# Patient Record
(demographics unavailable — no encounter records)

---

## 2017-05-25 NOTE — RADRPT
PROCEDURE:   XR right wrist. 

 

CLINICAL INDICATION:   Wrist pain 

 

TECHNIQUE:   4 views are available for review. 

 

COMPARISON:   No prior studies are available for comparison. 

 

FINDINGS:

 

The osseous structures are normal in mineralization, architecture and alignment.  No fracture or oss
eous lesion is identified.  The joints are unremarkable. No erosions are identified.The soft tissues
 are unremarkable.

 

 

IMPRESSION:

 

 

Unremarkable examination.    

 

 

RPTAT: HGDB

_____________________________________________ 

.Donnie Matson MD, MD           Date    Time 

Electronically viewed and signed by .Donnie Matson MD, MD on 05/25/2017 15:09 

 

D:  05/25/2017 15:09  T:  05/25/2017 15:09

.B/

## 2017-05-25 NOTE — RADRPT
PROCEDURE:   CT Brain without contrast. 

 

CLINICAL INDICATION:  Fall.  Headache and facial trauma.

 

TECHNIQUE:   A multiplanar CT of the brain was performed on a CT scanner utilizing axial imaging fro
m the skull base through the vertex without IV contrast.  The CTDIvol is 44.58 mGy and the DLP is 72
0.23 mGycm.  One or more of the following dose reduction techniques were utilized:  Automated exposu
re control, adjustment of the mA and/or kV according to patient size, use of iterative reconstructio
n technique.

 

COMPARISON:   None 

 

FINDINGS:

No evidence of intracranial hemorrhage or abnormal extra-axial fluid collection.

 

The brain parenchyma is normal attenuation morphology with preservation of gray white differentiatio
n and age appropriate size of the ventricles and subarachnoid spaces. Physiologic left basal ganglia
 calcification. Atherosclerotic calcification of the cavernous internal carotid arteries..

 

 

The basal cisterns, posterior fossa contents, brainstem, craniocervical junction, orbits, pituitary 
axis, paranasal sinuses, mastoid air cells, and calvarium are unremarkable.

 

 

 

IMPRESSION:

 

1.  No intracranial hemorrhage or acute intracranial abnormality.

 

 

 

 

RPTAT:AAJJ

_____________________________________________ 

Physician Yolanda           Date    Time 

Electronically viewed and signed by Physician Yolanda on 05/25/2017 14:04 

 

D:  05/25/2017 14:04  T:  05/25/2017 14:04

ALEXI/

## 2017-05-25 NOTE — RADRPT
PROCEDURE:   XR left knee. 

 

CLINICAL INDICATION:   Knee pain 

 

TECHNIQUE:   3 views are available for review. 

 

COMPARISON:   None available 

 

FINDINGS:

 

The osseous structures are normal in mineralization, architecture and alignment.  No fractures are i
dentified.  No osseous lesions are identified.  The joints are unremarkable.  The soft tissues are u
nremarkable.  

 

IMPRESSION:

 

Unremarkable examination

 

 

RPTAT: HGDB

_____________________________________________ 

.Donnie Matson MD, MD           Date    Time 

Electronically viewed and signed by .Donnie Matson MD, MD on 05/25/2017 15:10 

 

D:  05/25/2017 15:10  T:  05/25/2017 15:10

.B/

## 2017-05-25 NOTE — ERD
ER Documentation


Chief Complaint


Date/Time


DATE: 17 


TIME: 13:47


Chief Complaint


LEFT FACE/NOSE/ARM/HIP PAIN S/P FALL TODAY NO LOC





HPI


51-year-old female comes in status post slip and fall at [facility] today at around 

11:45 AM complaining of facial pain, right wrist pain, and left knee pain.  

Patient's pain is in the left side of her face in the cheek and the jaw, she 

states that she fell on the ground on this area.  Pain is achy, moderate, worse 

when she moves her head.  There was no loss of consciousness, vomiting.  She 

also is here for dorsal wrist pain on the right side, she states that she may 

have to try to catch herself but is not sure.  She also complains of bruising 

on the left knee and pain as well.





ROS


All systems reviewed and are negative except as per history of present illness.





Medications


Home Meds


Active Scripts


Ibuprofen* (Motrin*) 600 Mg Tab, 600 MG PO Q6, #30 TAB


   Prov:ZEB ALMONTE PA-C         17


Cetirizine Hcl* (Zyrtec*) 10 Mg Capsule, 10 MG PO DAILY, #14 TAB.CHEW


   Prov:ALBERTA ZIMMER PA-C         16


Ondansetron Hcl* (Zofran* ODT) 4 mg -ODT Tab.disper, 4 MG PO Q6 Y for NAUSEA AND

/OR VOMITING, #10 TAB


   Prov:SHONDA SHEFFIELD MD         16


Hydrocodone Bit-Acetaminophen* (Norco*)  Mg Tablet, 1 TAB PO Q6 Y for PAIN

, #7 TAB


   Prov:SHONDA SHEFFIELD MD         16


Metronidazole* (Flagyl*) 500 Mg Tablet, 500 MG PO TID for 10 Days, TAB


   Prov:SHONDA SHEFFIELD MD         16


Ciprofloxacin Hcl* (Ciprofloxacin Hcl*) 500 Mg Tablet, 500 MG PO BID for 10 Days

, TAB


   Prov:SHONDA SHEFFIELD MD         16


Naproxen* (Naprosyn*) 500 Mg Tablet, 500 MG PO BID Y for PAIN AND/OR 

INFLAMMATION, #30 TAB


   Prov:SUZANNE HANDY         7/24/15


Hydrocodone Bit-Acetaminophen* (Norco*) 5-325 Mg Tab, 1 TAB PO Q6 Y for PAIN, #

10 TAB


   Prov:SUZANNE HANDY         7/24/15


Reported Medications


Ibuprofen* (Ibuprofen*) 800 Mg Tab, 800 MG PO Q6H Y for PAIN, TAB


   10/1/14


Metformin Hcl* (Metformin Hcl*) 1,000 Mg Tablet, 1000 MG PO BID, TAB


   10/1/14


Omeprazole* (Omeprazole*) 40 Mg Capsule.dr, 40 MG PO DAILY, CAP


   10/1/14


Glyburide* (Glyburide*) 5 Mg Tablet, 10 MG PO BID, TAB


   10/1/14





Allergies


Allergies:  


Coded Allergies:  


     No Known Drug Allergies (Verified  Allergy, Mild, 7/23/15)





PMhx/Soc


History of Surgery:  Yes (gallbladder removed, hysterectomy,  x2)


Anesthesia Reaction:  No


Hx Neurological Disorder:  No


Hx Respiratory Disorders:  No


Hx Cardiac Disorders:  Yes (htn)


Hx Psychiatric Problems:  No


Hx Miscellaneous Medical Probl:  Yes (DM II)


Hx Alcohol Use:  No


Hx Substance Use:  No


Hx Tobacco Use:  No





Physical Exam


Vitals





Vital Signs








  Date Time  Temp Pulse Resp B/P Pulse Ox O2 Delivery O2 Flow Rate FiO2


 


17 12:31 98.3 71 20 155/73 100   








Physical Exam


General: Well-developed, well-nourished.  The patient appears in no acute 

distress.


HEENT: Head is normocephalic, scalp appears to be atraumatic.  She is able to 

open and close her jaw fully.  patient has soft tissue swelling on the right 

side, over the zygomatic arch, and jaw.  Patient is able to open and close her 

eyes, she has extraocular movements intact.  There is no hyphema or injection 

to the conjunctiva.. No scleral icterus.  Pupils are equal, round, and 

reactive. Oral mucous membranes are moist.  No pharyngeal erythema.


Neck: Supple.  Nontender.  No midline tenderness or crepitus.


Lungs: Clear to auscultation.  Normal air movement.


Heart: Regular rate and rhythm.  S1 and S2 are normal.  No murmurs, gallops, or 

rubs.


Abdomen: Soft, nontender, nondistended.  Bowel sounds are normoactive.


Extremities: Dorsal wrist swelling on the right side, and there is no snuffbox 

tenderness, radial, ulnar, median nerve intact.  Right medial anterior knee 

pain noted with ambulation, she is able to flex and extend the knee fully, 

there is ecchymosis.  No joint laxity.


Neurologic: Alert and oriented 3.  No focal deficits.


Skin: Normal turgor.  No rash or lesions.


Results 24 hrs





 Current Medications








 Medications


  (Trade)  Dose


 Ordered  Sig/Shyann


 Route


 PRN Reason  Start Time


 Stop Time Status Last Admin


Dose Admin


 


 Acetaminophen/


 Hydrocodone Bitart


  (Norco (5/325))  1 tab  ONCE  ONCE


 PO


   17 14:00


 17 14:01 DC 17 14:07


 





PROCEDURE:   XR right wrist. 


 


CLINICAL INDICATION:   Wrist pain 


 


TECHNIQUE:   4 views are available for review. 


 


COMPARISON:   No prior studies are available for comparison. 


 


FINDINGS:


 


The osseous structures are normal in mineralization, architecture and 

alignment.  No fracture or osseous lesion is identified.  The joints are 

unremarkable. No erosions are identified.The soft tissues are unremarkable.


 


 


IMPRESSION:


 


 


Unremarkable examination.    


 


 


RPTAT: HGDB


_____________________________________________ 


.Donnie Matson MD, MD           Date    Time 


Electronically viewed and signed by .Donnie Matson MD, MD on 2017 15:09 


 


D:  2017 15:09  T:  2017 15:09


.B/





CC: ZEB ALMONTE PA-C





PROCEDURE:   XR left knee. 


 


CLINICAL INDICATION:   Knee pain 


 


TECHNIQUE:   3 views are available for review. 


 


COMPARISON:   None available 


 


FINDINGS:


 


The osseous structures are normal in mineralization, architecture and 

alignment.  No fractures are identified.  No osseous lesions are identified.  

The joints are unremarkable.  The soft tissues are unremarkable.  


 


IMPRESSION:


 


Unremarkable examination


 


 


RPTAT: HGDB


_____________________________________________ 


.Donnie Matson MD, MD           Date    Time 


Electronically viewed and signed by .Donnie Matson MD, MD on 2017 15:10 


 


D:  2017 15:10  T:  2017 15:10


.B/





CC: ZEB ALMONTE PA-C





PROCEDURE:   CT Brain without contrast. 


 


CLINICAL INDICATION:  Fall.  Headache and facial trauma.


 


TECHNIQUE:   A multiplanar CT of the brain was performed on a CT scanner 

utilizing axial imaging from the skull base through the vertex without IV 

contrast.  The CTDIvol is 44.58 mGy and the DLP is 720.23 mGycm.  One or more 

of the following dose reduction techniques were utilized:  Automated exposure 

control, adjustment of the mA and/or kV according to patient size, use of 

iterative reconstruction technique.


 


COMPARISON:   None 


 


FINDINGS:


No evidence of intracranial hemorrhage or abnormal extra-axial fluid collection.


 


The brain parenchyma is normal attenuation morphology with preservation of gray 

white differentiation and age appropriate size of the ventricles and 

subarachnoid spaces. Physiologic left basal ganglia calcification. 

Atherosclerotic calcification of the cavernous internal carotid arteries..


 


 


The basal cisterns, posterior fossa contents, brainstem, craniocervical junction

, orbits, pituitary axis, paranasal sinuses, mastoid air cells, and calvarium 

are unremarkable.


 


 


 


IMPRESSION:


 


1.  No intracranial hemorrhage or acute intracranial abnormality.


 


 


 


 


RPTAT:AAJJ


_____________________________________________ 


VALERI Dai Physician           Date    Time 


Electronically viewed and signed by Physician Yolanda on 2017 14:04 


 


D:  2017 14:04  T:  2017 14:04


ALEXI/





PROCEDURE:   CT scan facial bones 


 


CLINICAL INDICATION:   Trauma.  Facial injury.  Pain. 


 


TECHNIQUE:   CT scan of the face was performed on the a high-resolution 

multidetector CT scanner with multiple contiguous axial images obtained through 

the face.  Coronal and sagittal reformatted images were obtained from the axial 

source images. Exam CTDI = 29.59 mGy and the DLP = 659.21 mGy-cm. 


 


COMPARISON:   None available.


 


FINDINGS:


No acute fracture or dislocation is seen. Mild left facial soft tissue swelling 

is noted.  The orbital globes are unremarkable. Nasal septum is intact.  

Paranasal sinuses demonstrate left greater than right probable mucous retention 

cysts, otherwise mild scattered mucosal thickening mainly in ethmoid air cells.


 


IMPRESSION:


 


1.  Mild left facial soft tissue swelling.  No acute facial fracture or 

dislocation.


 


2.  Mild scattered paranasal sinus disease.


 


RPTAT: HH


_____________________________________________ 


.Ailin Landin MD, MD           Date    Time 


Electronically viewed and signed by .Ailin Landin MD, MD on 2017 14:

21 


 


D:  2017 14:21  T:  2017 14:21


.N/





Procedures/MDM


ED course:


Patient was given Norco to treat her pain.





MDM: 51-year-old female comes into the status post slip and fall at [facility] 

today complaining of facial pain, wrist pain, left knee pain.  CT scan of the 

face and head is negative, no evidence of intracranial hemorrhage, skull 

fracture, no facial fracture noted.  X-rays of the wrist and knee are also 

normal.  Patient likely presents with a facial contusion, right wrist sprain, 

left knee contusion





Departure


Diagnosis:  


 Primary Impression:  


 Fall


 Additional Impressions:  


 Wrist sprain


 Facial contusion


Condition:  Good











ZEB ALMONTE PA-C May 25, 2017 13:49

## 2017-05-25 NOTE — RADRPT
PROCEDURE:   CT scan facial bones 

 

CLINICAL INDICATION:   Trauma.  Facial injury.  Pain. 

 

TECHNIQUE:   CT scan of the face was performed on the a high-resolution multidetector CT scanner wit
h multiple contiguous axial images obtained through the face.  Coronal and sagittal reformatted imag
es were obtained from the axial source images. Exam CTDI = 29.59 mGy and the DLP = 659.21 mGy-cm. 

 

COMPARISON:   None available.

 

FINDINGS:

No acute fracture or dislocation is seen. Mild left facial soft tissue swelling is noted.  The orbit
al globes are unremarkable. Nasal septum is intact.  Paranasal sinuses demonstrate left greater than
 right probable mucous retention cysts, otherwise mild scattered mucosal thickening mainly in ethmoi
d air cells.

 

IMPRESSION:

 

1.  Mild left facial soft tissue swelling.  No acute facial fracture or dislocation.

 

2.  Mild scattered paranasal sinus disease.

 

RPTAT: HH

_____________________________________________ 

.Ailin Landin MD, MD           Date    Time 

Electronically viewed and signed by .Ailin Landin MD, MD on 05/25/2017 14:21 

 

D:  05/25/2017 14:21  T:  05/25/2017 14:21

.N/